# Patient Record
Sex: FEMALE | Race: WHITE | NOT HISPANIC OR LATINO | URBAN - METROPOLITAN AREA
[De-identification: names, ages, dates, MRNs, and addresses within clinical notes are randomized per-mention and may not be internally consistent; named-entity substitution may affect disease eponyms.]

---

## 2018-10-24 LAB — CRC RECOMMENDATION EXT: NORMAL

## 2018-11-13 ENCOUNTER — HISTORICAL (OUTPATIENT)
Dept: RADIOLOGY | Facility: HOSPITAL | Age: 48
End: 2018-11-13

## 2018-11-13 LAB — POC CREATININE: 0.7 MG/DL (ref 0.6–1.3)

## 2020-08-12 ENCOUNTER — HISTORICAL (OUTPATIENT)
Dept: RADIOLOGY | Facility: HOSPITAL | Age: 50
End: 2020-08-12

## 2020-08-18 ENCOUNTER — HISTORICAL (OUTPATIENT)
Dept: RADIOLOGY | Facility: HOSPITAL | Age: 50
End: 2020-08-18

## 2020-10-27 ENCOUNTER — HISTORICAL (OUTPATIENT)
Dept: PREADMISSION TESTING | Facility: HOSPITAL | Age: 50
End: 2020-10-27

## 2020-10-27 LAB
ABS NEUT (OLG): 4.39 X10(3)/MCL (ref 2.1–9.2)
ALBUMIN SERPL-MCNC: 4 GM/DL (ref 3.5–5)
ALBUMIN/GLOB SERPL: 1.4 RATIO (ref 1.1–2)
ALP SERPL-CCNC: 100 UNIT/L (ref 40–150)
ALT SERPL-CCNC: 19 UNIT/L (ref 0–55)
AST SERPL-CCNC: 18 UNIT/L (ref 5–34)
BASOPHILS # BLD AUTO: 0 X10(3)/MCL (ref 0–0.2)
BASOPHILS NFR BLD AUTO: 0 %
BILIRUB SERPL-MCNC: 0.4 MG/DL
BILIRUBIN DIRECT+TOT PNL SERPL-MCNC: 0.1 MG/DL (ref 0–0.5)
BILIRUBIN DIRECT+TOT PNL SERPL-MCNC: 0.3 MG/DL (ref 0–0.8)
BUN SERPL-MCNC: 14 MG/DL (ref 9.8–20.1)
CALCIUM SERPL-MCNC: 10.6 MG/DL (ref 8.4–10.2)
CHLORIDE SERPL-SCNC: 105 MMOL/L (ref 98–107)
CO2 SERPL-SCNC: 29 MMOL/L (ref 22–29)
CREAT SERPL-MCNC: 0.74 MG/DL (ref 0.55–1.02)
EOSINOPHIL # BLD AUTO: 0.3 X10(3)/MCL (ref 0–0.9)
EOSINOPHIL NFR BLD AUTO: 4 %
ERYTHROCYTE [DISTWIDTH] IN BLOOD BY AUTOMATED COUNT: 11.9 % (ref 11.5–17)
GLOBULIN SER-MCNC: 2.9 GM/DL (ref 2.4–3.5)
GLUCOSE SERPL-MCNC: 97 MG/DL (ref 74–100)
HCT VFR BLD AUTO: 42.1 % (ref 37–47)
HGB BLD-MCNC: 13.7 GM/DL (ref 12–16)
LYMPHOCYTES # BLD AUTO: 2.6 X10(3)/MCL (ref 0.6–4.6)
LYMPHOCYTES NFR BLD AUTO: 33 %
MCH RBC QN AUTO: 29 PG (ref 27–31)
MCHC RBC AUTO-ENTMCNC: 32.5 GM/DL (ref 33–36)
MCV RBC AUTO: 89.2 FL (ref 80–94)
MONOCYTES # BLD AUTO: 0.5 X10(3)/MCL (ref 0.1–1.3)
MONOCYTES NFR BLD AUTO: 6 %
NEUTROPHILS # BLD AUTO: 4.39 X10(3)/MCL (ref 2.1–9.2)
NEUTROPHILS NFR BLD AUTO: 57 %
PLATELET # BLD AUTO: 238 X10(3)/MCL (ref 130–400)
PMV BLD AUTO: 10 FL (ref 9.4–12.4)
POTASSIUM SERPL-SCNC: 4.8 MMOL/L (ref 3.5–5.1)
PROT SERPL-MCNC: 6.9 GM/DL (ref 6.4–8.3)
RBC # BLD AUTO: 4.72 X10(6)/MCL (ref 4.2–5.4)
SODIUM SERPL-SCNC: 143 MMOL/L (ref 136–145)
WBC # SPEC AUTO: 7.8 X10(3)/MCL (ref 4.5–11.5)

## 2020-10-30 ENCOUNTER — HISTORICAL (OUTPATIENT)
Dept: ADMINISTRATIVE | Facility: HOSPITAL | Age: 50
End: 2020-10-30

## 2022-04-29 NOTE — H&P
Patient:   Ling Palomo            MRN: [id number]            FIN: [id number]               Age:   50 years     Sex:  Female     :  1970   Associated Diagnoses:   Disease of gallbladder   Author:   Riya Reyna      Basic Information   Source of history:  Self.    History limitation:  None.       Chief Complaint   Abd pain      History of Present Illness   Ms. Palomo is a 49 yo female that presents to [facility] for lap cholecystectomy. Abd US negative. HIDA scan revealed hyperkinetic GB, EF 95%.  Pt c/o intermittent abd pain. No complaints at present.       Review of Systems   Constitutional:  Negative.    Eye:  Negative.    Ear/Nose/Mouth/Throat:  Negative.    Respiratory:  Negative.    Cardiovascular:  Negative.    Gastrointestinal:  Negative.    Genitourinary:  Negative.    Gynecologic:  Negative.    Hematology/Lymphatics:  Negative.    Endocrine:  Negative.    Immunologic:  Negative.    Musculoskeletal:  Negative.    Integumentary:  Negative.    Neurologic:  Negative.    Psychiatric:  Negative.       Health Status   Allergies:    Allergic Reactions (Selected)  No Known Allergies   Current medications:  (Selected)   Inpatient Medications  Ordered  Ancef: 1 gm, form: Infusion, IV Piggyback, On Call, first dose 10/30/20 10:15:00 CDT  Buffered Lidocaine 1% - 1mL syringe: 0.5 mL, form: Injection, ID, As Directed PRN for other (see comment), first dose 10/30/20 10:17:00 CDT, May inject 0.5mL at IV site, if not allergic  CeleBREX: 200 mg, form: Cap, Oral, Once-Unscheduled, first dose 10/30/20 10:17:00 CDT, in holding preop  Lactated Ringers Injection intravenous solution 1,000 mL: 1,000 mL, 1,000 mL, IV, 75 mL/hr, start date 10/30/20 10:15:00 CDT  Lovenox: 40 mg, form: Injection, Subcutaneous, Once, first dose 10/30/20 11:00:00 CDT, stop date 10/30/20 11:00:00 CDT, 2 hours prior to surgery  Neurontin 300 mg oral capsule: 600 mg, form: Cap, Oral, Once-Unscheduled, first dose 10/30/20 10:17:00  CDT, in holding preop  Zofran: 4 mg, IV Push, On Call, first dose 10/30/20 10:17:00 CDT, (give if Reglan contraindicated or patient has history of post anesthesia nausea/vomiting)  acetaminophen: 1,000 mg, form: Tab, Oral, Once-Unscheduled, first dose 10/30/20 11:17:00 CDT, once preop in holding  midazolam: 1 mg, IV Push, q5min PRN for anxiety, Order duration: 2 dose(s), first dose 10/30/20 10:17:00 CDT, stop date Limited # of times, STAT, may repeat x1 in 5 minutes if anxiety not relieved.  Hold  if pregnancy test is pending.  (ADULT PATIENTS)  Documented Medications  Documented  Lexapro 10 mg oral tablet: 0 Refill(s)   Problem list:    All Problems  Depression / SNOMED CT 326835761 / Confirmed  Obesity / SNOMED CT 6599826064 / Probable      Histories   Past Medical History:    No active or resolved past medical history items have been selected or recorded.   Family History:    No family history items have been selected or recorded.   Procedure history:    No active procedure history items have been selected or recorded.   Social History        Social & Psychosocial Habits    Abuse/Neglect  10/28/2020  SHX Any signs of abuse or neglect No  .        Physical Examination   Vital Signs   10/30/2020 10:16 CDT     Temperature Oral          36.7 DegC                             Temperature Oral (calculated)             98.06 DegF                             Peripheral Pulse Rate     83 bpm                             SpO2                      99 %                             Systolic Blood Pressure   144 mmHg  HI                             Diastolic Blood Pressure  75 mmHg                             Mean Arterial Pressure, Cuff              98 mmHg     General:  Alert and oriented, No acute distress.    Neck:  Supple.    Respiratory:  Lungs are clear to auscultation, Respirations are non-labored.    Cardiovascular:  Normal rate, Regular rhythm.    Gastrointestinal:  Soft, Non-tender.    Musculoskeletal:  Normal range  of motion.    Integumentary:  Warm, Dry.    Neurologic:  Alert, Oriented.    Psychiatric:  Cooperative, Appropriate mood & affect.       Impression and Plan   Diagnosis     Disease of gallbladder (ZCS55-FE K82.9).     Education and Follow-up:       Counseled: Patient, Regarding diagnosis, Regarding treatment.    Cherelle Monreal examined patient and obtained informed consent. All pre op questions answered.

## 2022-04-29 NOTE — OP NOTE
Patient:   Ling Palomo            MRN: [id number]            FIN: [id number]               Age:   50 years     Sex:  Female     :  1970   Associated Diagnoses:   Cholecystitis, chronic   Author:   Chon Monreal MD      Operative Note   Operative Information   Procedures Performed: Procedure Code   68821 - LAP CHOLECYSTECTOMY (None) on 10/30/2020 at 50 Years.  Comments:  10/30/2020 13:27 EMANUEL - Felix MEJIA, Amy Mckeon  auto-populated from documented surgical case.     Indications: chronic cholecystitis.     Preoperative Diagnosis: Cholecystitis, chronic (APV23-PZ K81.1).     Postoperative Diagnosis: Cholecystitis, chronic (OVU96-YZ K81.1).     Surgeon: Chon Monreal MD.     Assistant: Riya Reyna     Anesthesia: Gen..     Speciman Removed: gallbladder.     Description of Procedure/Findings/    Complications:     The patient was taken to the operating room. Patient was placed under general anesthesia. Abdomen was prepped and draped in the usual sterile fashion. An appropriate timeout was performed. Optical tipped trocar was used to access the peritoneal cavity. Pneumoperitoneum was instituted. Abdominal exploration was negative. Gallbladder was noted and held in a cephalad direction. The infundibulum was noted and held in a lateral direction. The peritoneum overlying the infundibulum was dissected revealing the cystic duct gallbladder junction and the cystic artery. Critical view was obtained. The cystic duct and cystic artery were clipped and transected. The gallbladder was removed from the undersurface of the liver with sharp and electric dissection. Hemostasis was assured. The clips were in excellent position and there was no bleeding or leakage of bile. Gallbladder was completely removed from the liver hemostasis was assured. The gallbladder was removed from the abdomen. Once again hemostasis was assured the operative site was irrigated until clear. Trochars  were removed and pneumoperitoneum released the incisions were closed with Vicryl..     Esimated blood loss: loss less than  15  cc.     Complications: None.

## 2023-09-21 LAB — BCS RECOMMENDATION EXT: NORMAL

## 2024-01-25 ENCOUNTER — OFFICE VISIT (OUTPATIENT)
Dept: FAMILY MEDICINE | Facility: CLINIC | Age: 54
End: 2024-01-25
Payer: COMMERCIAL

## 2024-01-25 ENCOUNTER — DOCUMENTATION ONLY (OUTPATIENT)
Dept: FAMILY MEDICINE | Facility: CLINIC | Age: 54
End: 2024-01-25

## 2024-01-25 VITALS
DIASTOLIC BLOOD PRESSURE: 84 MMHG | OXYGEN SATURATION: 95 % | SYSTOLIC BLOOD PRESSURE: 110 MMHG | TEMPERATURE: 98 F | RESPIRATION RATE: 17 BRPM | WEIGHT: 196.19 LBS | HEART RATE: 98 BPM

## 2024-01-25 DIAGNOSIS — Z76.89 ENCOUNTER TO ESTABLISH CARE: Primary | ICD-10-CM

## 2024-01-25 DIAGNOSIS — Z98.890 S/P LUMPECTOMY, LEFT BREAST: ICD-10-CM

## 2024-01-25 DIAGNOSIS — R94.8 ABNORMAL RADIONUCLIDE BONE SCAN: ICD-10-CM

## 2024-01-25 DIAGNOSIS — E78.2 MIXED HYPERLIPIDEMIA: ICD-10-CM

## 2024-01-25 DIAGNOSIS — Z13.1 ENCOUNTER FOR SCREENING FOR DIABETES MELLITUS: ICD-10-CM

## 2024-01-25 DIAGNOSIS — Z00.00 WELLNESS EXAMINATION: ICD-10-CM

## 2024-01-25 DIAGNOSIS — Z11.59 ENCOUNTER FOR HEPATITIS C SCREENING TEST FOR LOW RISK PATIENT: ICD-10-CM

## 2024-01-25 DIAGNOSIS — E04.1 THYROID NODULE: ICD-10-CM

## 2024-01-25 DIAGNOSIS — M85.80 OSTEOPENIA, UNSPECIFIED LOCATION: ICD-10-CM

## 2024-01-25 DIAGNOSIS — D35.1 PARATHYROID ADENOMA: ICD-10-CM

## 2024-01-25 DIAGNOSIS — Z11.4 ENCOUNTER FOR SCREENING FOR HIV: ICD-10-CM

## 2024-01-25 PROCEDURE — 3074F SYST BP LT 130 MM HG: CPT | Mod: CPTII,,, | Performed by: STUDENT IN AN ORGANIZED HEALTH CARE EDUCATION/TRAINING PROGRAM

## 2024-01-25 PROCEDURE — 99386 PREV VISIT NEW AGE 40-64: CPT | Mod: ,,, | Performed by: STUDENT IN AN ORGANIZED HEALTH CARE EDUCATION/TRAINING PROGRAM

## 2024-01-25 PROCEDURE — 1159F MED LIST DOCD IN RCRD: CPT | Mod: CPTII,,, | Performed by: STUDENT IN AN ORGANIZED HEALTH CARE EDUCATION/TRAINING PROGRAM

## 2024-01-25 PROCEDURE — 3079F DIAST BP 80-89 MM HG: CPT | Mod: CPTII,,, | Performed by: STUDENT IN AN ORGANIZED HEALTH CARE EDUCATION/TRAINING PROGRAM

## 2024-01-25 PROCEDURE — 1160F RVW MEDS BY RX/DR IN RCRD: CPT | Mod: CPTII,,, | Performed by: STUDENT IN AN ORGANIZED HEALTH CARE EDUCATION/TRAINING PROGRAM

## 2024-01-25 RX ORDER — EXEMESTANE 25 MG/1
25 TABLET ORAL EVERY MORNING
COMMUNITY

## 2024-01-25 RX ORDER — VENLAFAXINE HYDROCHLORIDE 37.5 MG/1
1 CAPSULE, EXTENDED RELEASE ORAL EVERY MORNING
COMMUNITY
Start: 2023-08-28

## 2024-01-25 RX ORDER — IVERMECTIN 10 MG/G
1 CREAM TOPICAL DAILY
COMMUNITY
Start: 2018-12-22

## 2024-01-25 NOTE — PROGRESS NOTES
Patient ID: [id number]     Chief Complaint: Establish Care        HPI:      Disclaimer:  This note is prepared using voice recognition software and as such is likely to have errors despite attempts at proofreading. Please contact me for questions.    Ling Palomo is a 53 y.o. female here today for an annual wellness visit.    Patient is here to establish care.    Acute Issues-    S/p left breast lumpectomy for infiltrating ductal carcinoma diagnosed on .  Patient has been following Dr. Ira Dumont from [facility].  She is status post lumpectomy on 2022 and completed chemotherapy and radiotherapy regarding the same.  Her last mammogram on 2023 was unremarkable    Thyroid nodule   Is following Dr. Feng from [facility].   Most recent sestamibi scan demonstrated calcified left thyroid nodule measuring 0.6 x 0.9 cm  Biopsy demonstrated colloid nodule    Prominent right level 2 axillary lymph node.  Is scheduled to see  Parathyroid adenoma  Has been having hypercalcemia likely secondary to parathyroid adenoma.    Currently asymptomatic  Is about to undergo parathyroidectomy at [facility]    She recently had a surveillance NM bone scan on 2023 which demonstrated increased tracer activity at the costochondral junction of left 10th rib which requires CT contrast chest.     DEXA scan on 2023 demonstrated osteopenia  Currently not on any oral supplementations    Chronic Issues-  No past medical history on file.     Past Surgical History:   Procedure Laterality Date    BREAST SURGERY  2022    lumpectomy, mastopexy     SECTION  1987, 1992    CHOLECYSTECTOMY  10/30/2020    COSMETIC SURGERY  2023    Mastopexy    HYSTERECTOMY  2008    Partial    TUBAL LIGATION  1992       Review of patient's allergies indicates:   Allergen Reactions    Adhesive     Adhesive tape-silicones      Paper tape- redness       Current Outpatient Medications   Medication  Instructions    exemestane (AROMASIN) 25 mg, Oral, Every morning    ivermectin (SOOLANTRA) 1 %, Topical (Top), Daily    venlafaxine (EFFEXOR-XR) 37.5 MG 24 hr capsule 1 capsule, Oral, Every morning       Social History     Socioeconomic History    Marital status: Single   Tobacco Use    Smoking status: Former     Current packs/day: 0.25     Average packs/day: 0.3 packs/day for 2.0 years (0.5 ttl pk-yrs)     Types: Cigarettes     Passive exposure: Never    Smokeless tobacco: Never   Substance and Sexual Activity    Alcohol use: Yes     Alcohol/week: 1.0 standard drink of alcohol     Types: 1 Glasses of wine per week    Drug use: Not Currently     Types: Marijuana     Comment: as a teenager    Sexual activity: Yes     Partners: Female     Birth control/protection: Post-menopausal     Social Determinants of Health     Financial Resource Strain: Low Risk  (1/22/2024)    Overall Financial Resource Strain (CARDIA)     Difficulty of Paying Living Expenses: Not hard at all   Food Insecurity: No Food Insecurity (1/22/2024)    Hunger Vital Sign     Worried About Running Out of Food in the Last Year: Never true     Ran Out of Food in the Last Year: Never true   Transportation Needs: No Transportation Needs (1/22/2024)    PRAPARE - Transportation     Lack of Transportation (Medical): No     Lack of Transportation (Non-Medical): No   Physical Activity: Unknown (1/22/2024)    Exercise Vital Sign     Days of Exercise per Week: 0 days   Stress: No Stress Concern Present (1/22/2024)    [employer] - Occupational Stress Questionnaire     Feeling of Stress : Only a little   Social Connections: Unknown (1/22/2024)    Social Connection and Isolation Panel [NHANES]     Frequency of Communication with Friends and Family: More than three times a week     Frequency of Social Gatherings with Friends and Family: Once a week     Active Member of Clubs or Organizations: No     Marital Status:    Housing  Stability: Low Risk  (1/22/2024)    Housing Stability Vital Sign     Unable to Pay for Housing in the Last Year: No     Number of Places Lived in the Last Year: 1     Unstable Housing in the Last Year: No        Family History   Problem Relation Age of Onset    Cancer Mother     Depression Mother     Cancer Father     Heart disease Father     Cancer Paternal Grandfather     Cancer Paternal Grandmother     Cancer Paternal Aunt     Cancer Paternal Aunt     Depression Daughter     Depression Brother     Diabetes Brother         Patient Care Team:  Brianna Khalil MD as PCP - General (Family Medicine)       Subjective:     ROS    See HPI for details    Constitutional: Denies Change in appetite. Denies Chills. Denies Fever. Denies Night sweats.  Respiratory: Denies Cough. Denies Shortness of breath. Denies Shortness of breath with exertion. Denies Wheezing.  Cardiovascular: DeniesChest pain at rest. Denies Chest pain with exertion. Denies Irregular heartbeat. Denies Palpitations. Denies Edema.  Gastrointestinal: Denies Abdominal pain. DeniesDiarrhea. Denies Nausea. Denies Vomiting. Denies Hematemesis or Hematochezia.  Genitourinary: Denies Dysuria. Denies Urinary frequency. Denies Urinary urgency. Denies Blood in urine.  Endocrine: Denies Cold intolerance. Denies Excessive thirst. Denies Heat intolerance. Denies Weight loss. Denies Weight gain.  Musculoskeletal: Denies Painful joints. Denies Weakness.  Integumentary: Denies Rash. Denies Itching. Denies Dry skin.  Neurologic: Denies Dizziness. Denies Fainting. Denies Headache.  Psychiatric: Denies Depression. Denies Anxiety. Denies Suicidal/Homicidal ideations.    All Other ROS: Negative except as stated in HPI.       Objective:     Visit Vitals  /84 (BP Location: Left arm, Patient Position: Sitting, BP Method: Large (Manual))   Pulse 98   Temp 98.1 °F (36.7 °C) (Oral)   Resp 17   Wt 89 kg (196 lb 3.2 oz)   SpO2 95%       Physical Exam    General: Alert and  oriented, No acute distress.  Neck/Thyroid:  Palpable thyroid  Respiratory: Clear to auscultation bilaterally; No wheezes  Cardiovascular: Regular rate and rhythm  Gastrointestinal: Soft, Non-tender,No palpable organomegaly.  Musculoskeletal: Normal range of motion.  Neurologic: No focal deficits  Psychiatric: Normal interaction, Coherent speech, Euthymic mood, Appropriate affect       Assessment:       ICD-10-CM ICD-9-CM   1. Encounter to establish care  Z76.89 V65.8   2. Encounter for screening for HIV  Z11.4 V73.89   3. Encounter for hepatitis C screening test for low risk patient  Z11.59 V73.89   4. Encounter for screening for diabetes mellitus  Z13.1 V77.1   5. Parathyroid adenoma  D35.1 227.1   6. Mixed hyperlipidemia  E78.2 272.2   7. Thyroid nodule  E04.1 241.0   8. S/P lumpectomy, left breast  Z98.890 V45.89   9. Abnormal radionuclide bone scan  R94.8 794.9   10. Wellness examination  Z00.00 V70.0        Plan:         Health Maintenance Topics with due status: Not Due       Topic Last Completion Date    TETANUS VACCINE 08/12/2023    Mammogram 09/21/2023    Lipid Panel 09/21/2023          Vaccinations -   Immunization History   Administered Date(s) Administered    COVID-19, MRNA, LN-S, PF (Pfizer) (Purple Cap) 04/17/2021, 05/08/2021, 12/11/2021    Influenza - Quadrivalent - [facility] - PF 01/06/2024    Influenza - Quadrivalent - PF *Preferred* (6 months and older) 02/04/2018, 09/28/2020, 12/11/2021    Tdap 08/12/2023        1. Encounter to establish care  2. Wellness examination  -     Urinalysis; Future; Expected date: 01/25/2024  Cervical Cancer Screening - last Pap smear performed by Dr. Farris at [facility]   Breast Cancer Screening - Last Mammogram on 9/23. Normal. Follow up in 1 year  Colon Cancer Screening - colonoscopy completed at [facility] in a Gastroenterology.  We will retrieve the records   Osteoporosis Screening -  DEXA demonstrated osteopenia  Eye Exam - Recommend annually.  Dental Exam - Recommend  biannual exams.     Diet discussed (healthy food choices, reduce portions and overall calorie intake)  Exercise 30-45 minutes 5x per week  Avoid excessive alcohol and tobacco if taking  Immunizations dicussed  Monthly breast exam recommended  Preventative exams discussed.      3. Encounter for screening for HIV  -     HIV 1/2 Ag/Ab (4th Gen); Future; Expected date: 01/25/2024    4. Encounter for hepatitis C screening test for low risk patient  -     Hepatitis C Antibody; Future; Expected date: 01/25/2024    5. Encounter for screening for diabetes mellitus  -     Hemoglobin A1C; Future; Expected date: 01/25/2024    6. Parathyroid adenoma  We will recommend thyroidectomy per [facility]    7. Mixed hyperlipidemia  Recommend to start 35 minutes of brisk walking along with Mediterranean diet    8. Thyroid nodule  Keep scheduled follow up with endocrinology Dr. Feng at [facility]  We will continue to monitor    9. S/P lumpectomy, left breast  Keep scheduled visit with Dr. Ira Dumont    10. Abnormal radionuclide bone scan  -     CT Chest With Contrast; Future; Expected date: 01/25/2024    11. Osteopenia, unspecified location  Recommendations   Calcium/vitamin-D-  Encourage adequate calcium intake from diet alone (approximately 1200 mg daily).  I  ncorporate food like milk, yogurt, orange juice, chills food with calcium, cheese, beans, dark leafy green vegetables and walnuts.    If unable to take dietary intake then can start supplemental calcium not exceeding 500-1000 mg per day in divided doses.    Confirm with the cardiologist if you could take calcium supplements are not.    Should achieve daily intake of 800 I U of vitamin-D.  Weight bearing exercise such as walking or resistance training to build bones 5 times a week.  Avoid soda and excessive alcohol.  Avoid falls by assessing home for loose cords and rugs, wearing proper footwear, and using proper lighting in rooms.   Repeat DEXA  per endocrine in [facility]         Medication List with Changes/Refills   Current Medications    EXEMESTANE (AROMASIN) 25 MG TABLET    Take 25 mg by mouth every morning.       Start Date: --        End Date: --    IVERMECTIN (SOOLANTRA) 1 % CREA    Apply 1 % topically Daily.       Start Date: 12/22/2018End Date: --    VENLAFAXINE (EFFEXOR-XR) 37.5 MG 24 HR CAPSULE    Take 1 capsule by mouth every morning.       Start Date: 8/28/2023 End Date: --          The patient's Health Maintenance was reviewed and the following appears to be due at this time:   Health Maintenance Due   Topic Date Due    Hepatitis C Screening  Never done    HIV Screening  Never done    Colorectal Cancer Screening  Never done    Shingles Vaccine (1 of 2) Never done    COVID-19 Vaccine (4 - 2023-24 season) 09/01/2023         Follow up in about 4 months (around 5/25/2024) for Parthyrroid. 1 AW .   In addition to their scheduled follow up, the patient has also been instructed to follow up on as needed basis.

## 2024-01-26 ENCOUNTER — DOCUMENTATION ONLY (OUTPATIENT)
Dept: FAMILY MEDICINE | Facility: CLINIC | Age: 54
End: 2024-01-26
Payer: COMMERCIAL

## 2024-05-22 ENCOUNTER — TELEPHONE (OUTPATIENT)
Dept: FAMILY MEDICINE | Facility: CLINIC | Age: 54
End: 2024-05-22
Payer: COMMERCIAL

## 2024-05-22 NOTE — TELEPHONE ENCOUNTER
Are there any outstanding tasks in the patients's chart (ex.labs,MM,etc)? Yes 3  Do we have outstanding/pending referrals? no  Has the patient been seen in and ER,UCC, or been admitted since last visit? no  Has the patient seen any other health care provider(doctors) since last visit? no  Has the patient had any bloodwork or x-rays done since last visit? Yes a CT scan.     Called pt to let her know, no answer. Left a detailed message.

## 2024-05-27 ENCOUNTER — OFFICE VISIT (OUTPATIENT)
Dept: FAMILY MEDICINE | Facility: CLINIC | Age: 54
End: 2024-05-27
Payer: COMMERCIAL

## 2024-05-27 DIAGNOSIS — Z00.00 WELL ADULT HEALTH CHECK: ICD-10-CM

## 2024-05-27 DIAGNOSIS — Z98.890 STATUS POST PARATHYROIDECTOMY: ICD-10-CM

## 2024-05-27 DIAGNOSIS — E04.2 MULTINODULAR GOITER: ICD-10-CM

## 2024-05-27 DIAGNOSIS — Z85.3 HISTORY OF BREAST CANCER: ICD-10-CM

## 2024-05-27 DIAGNOSIS — Z90.89 STATUS POST PARATHYROIDECTOMY: ICD-10-CM

## 2024-05-27 PROBLEM — M25.559 HIP PAIN: Status: ACTIVE | Noted: 2022-10-13

## 2024-05-27 PROBLEM — Z79.811 LONG TERM CURRENT USE OF AROMATASE INHIBITOR: Status: ACTIVE | Noted: 2022-10-13

## 2024-05-27 PROBLEM — Z17.0 ESTROGEN RECEPTOR POSITIVE STATUS (ER+): Status: ACTIVE | Noted: 2022-10-13

## 2024-05-27 PROBLEM — N95.1 MENOPAUSAL FLUSHING: Status: ACTIVE | Noted: 2022-10-13

## 2024-05-27 PROBLEM — E21.0 PRIMARY HYPERPARATHYROIDISM: Status: ACTIVE | Noted: 2023-12-12

## 2024-05-27 PROBLEM — C50.812 MALIGNANT NEOPLASM OF OVERLAPPING SITES OF LEFT FEMALE BREAST: Status: ACTIVE | Noted: 2021-12-23

## 2024-05-27 PROBLEM — N65.0 DEFORMITY OF RECONSTRUCTED BREAST: Status: ACTIVE | Noted: 2022-02-03

## 2024-05-27 PROBLEM — Z80.3 FAMILY HISTORY OF MALIGNANT NEOPLASM OF BREAST: Status: ACTIVE | Noted: 2022-02-01

## 2024-05-27 PROCEDURE — 1160F RVW MEDS BY RX/DR IN RCRD: CPT | Mod: CPTII,95,, | Performed by: STUDENT IN AN ORGANIZED HEALTH CARE EDUCATION/TRAINING PROGRAM

## 2024-05-27 PROCEDURE — 1159F MED LIST DOCD IN RCRD: CPT | Mod: CPTII,95,, | Performed by: STUDENT IN AN ORGANIZED HEALTH CARE EDUCATION/TRAINING PROGRAM

## 2024-05-27 PROCEDURE — 99213 OFFICE O/P EST LOW 20 MIN: CPT | Mod: 95,,, | Performed by: STUDENT IN AN ORGANIZED HEALTH CARE EDUCATION/TRAINING PROGRAM

## 2024-05-27 NOTE — PROGRESS NOTES
Patient ID: [id number]     Chief Complaint: No chief complaint on file.      HPI:     This is a telemedicine note. Patient was treated using telemedicine, real time audio and video, according to [facility] protocols. I, Brianna Khalil MD, conducted the visit from the [facility]. The patient participated in the visit at a non-[facility] location selected by the patient, identified below. I am licensed in the state where the patient stated they are located. The patient stated that they understood and accepted the privacy and security risks to their information at their location. This visit is not recorded.    Patient was located at the patient's home.       Ling Palomo is a 53 y.o. female here today for a telemedicine visit.     S/p left breast lumpectomy for infiltrating ductal carcinoma diagnosed on .  Patient has been following Dr. Ira Dumont from [facility].  She is status post lumpectomy on 2022 and completed chemotherapy and radiotherapy regarding the same.  Her last mammogram on 2023 was unremarkable     Thyroid nodule   Is following Dr. Feng from [facility].   Most recent sestamibi scan demonstrated calcified left thyroid nodule measuring 0.6 x 0.9 cm  Biopsy demonstrated colloid nodule and being monitored by Dr. Feng     Prominent right level 2 axillary lymph node.   Parathyroid adenoma  S/p  parathyroidectomy at [facility] 3/8/24     She rhad a surveillance NM bone scan on 2023 which demonstrated increased tracer activity at the costochondral junction of left 10th rib which requires CT contrast chest. Ct ordered but never got a chance to get the CT. Asx     DEXA scan on 2023 demonstrated osteopenia  Currently not on any oral supplementations       No past medical history on file.     Past Surgical History:   Procedure Laterality Date    BREAST SURGERY  2022    lumpectomy, mastopexy     SECTION  1987, 1992    CHOLECYSTECTOMY   10/30/2020    COSMETIC SURGERY  02/03/2023    Mastopexy    HYSTERECTOMY  2008    Partial    TUBAL LIGATION  08/11/1992       Review of patient's allergies indicates:   Allergen Reactions    Adhesive     Adhesive tape-silicones      Paper tape- redness       Current Outpatient Medications   Medication Instructions    exemestane (AROMASIN) 25 mg, Oral, Every morning    ivermectin (SOOLANTRA) 1 %, Topical (Top), Daily    venlafaxine (EFFEXOR-XR) 37.5 MG 24 hr capsule 1 capsule, Oral, Every morning       Social History     Socioeconomic History    Marital status: Single   Tobacco Use    Smoking status: Former     Current packs/day: 0.25     Average packs/day: 0.3 packs/day for 2.0 years (0.5 ttl pk-yrs)     Types: Cigarettes     Passive exposure: Never    Smokeless tobacco: Never   Substance and Sexual Activity    Alcohol use: Yes     Alcohol/week: 1.0 standard drink of alcohol     Types: 1 Glasses of wine per week    Drug use: Not Currently     Types: Marijuana     Comment: as a teenager    Sexual activity: Yes     Partners: Female     Birth control/protection: Post-menopausal     Social Determinants of Health     Financial Resource Strain: Low Risk  (1/22/2024)    Overall Financial Resource Strain (CARDIA)     Difficulty of Paying Living Expenses: Not hard at all   Food Insecurity: No Food Insecurity (1/22/2024)    Hunger Vital Sign     Worried About Running Out of Food in the Last Year: Never true     Ran Out of Food in the Last Year: Never true   Transportation Needs: No Transportation Needs (1/22/2024)    PRAPARE - Transportation     Lack of Transportation (Medical): No     Lack of Transportation (Non-Medical): No   Physical Activity: Unknown (1/22/2024)    Exercise Vital Sign     Days of Exercise per Week: 0 days   Stress: No Stress Concern Present (1/22/2024)    [employer] [place] [employer] - Occupational Stress Questionnaire     Feeling of Stress : Only a little   Housing Stability: Low Risk   (1/22/2024)    Housing Stability Vital Sign     Unable to Pay for Housing in the Last Year: No     Number of Places Lived in the Last Year: 1     Unstable Housing in the Last Year: No        Family History   Problem Relation Name Age of Onset    Cancer Mother Karolina Saldana     Depression Mother Karolina Saldana     Cancer Father Ravi Brown     Heart disease Father Ravi Brown     Cancer Paternal Grandfather Michael Brown     Cancer Paternal Grandmother Windy Brown     Cancer Paternal Aunt Christel Powers     Cancer Paternal Aunt Crista Swift     Depression Daughter Jovita Alvares     Depression Brother Migue Brown     Diabetes Brother Shabbir Brown         Patient Care Team:  Brianna Khalil MD as PCP - General (Family Medicine)      Subjective:       ROS    See HPI for details    Constitutional: Denies Change in appetite. Denies Chills. Denies Fever. Denies Night sweats.  Eye: Denies Blurred vision. Denies Discharge. Denies Eye pain.  ENT: Denies Decreased hearing. Denies Sore throat. Denies Swollen glands.  Respiratory: Denies Cough. Denies Shortness of breath. Denies Shortness of breath with exertion. Denies Wheezing.  Cardiovascular: DeniesChest pain at rest. Denies Chest pain with exertion. Denies Irregular heartbeat. Denies Palpitations. Denies Edema.  Gastrointestinal: Denies Abdominal pain. DeniesDiarrhea. Denies Nausea. Denies Vomiting. Denies Hematemesis or Hematochezia.  Genitourinary: Denies Dysuria. Denies Urinary frequency. Denies Urinary urgency. Denies Blood in urine.  Endocrine: Denies Cold intolerance. Denies Excessive thirst. Denies Heat intolerance. Denies Weight loss. Denies Weight gain.  Musculoskeletal: Denies Painful joints. Denies Weakness.  Integumentary: Denies Rash. Denies Itching. Denies Dry skin.  Neurologic: Denies Dizziness. Denies Fainting. Denies Headache.  Psychiatric: Denies Depression. Denies Anxiety. Denies Suicidal/Homicidal  ideations.    All Other ROS: Negative except as stated in HPI.       Objective:     There were no vitals taken for this visit.    Physical Exam    Physical Exam: LIMITED DUE TO TELEMEDICINE RESTRICTIONS.  General: Alert and oriented, No acute distress.  Head: Normocephalic, Atraumatic.  Eye: Sclera non-icteric.  Neck/Thyroid:  Full range of motion.  Respiratory: Non-labored respirations, Symmetrical chest wall expansion.  Musculoskeletal: Normal range of motion.  Integumentary:  No visible suspicious lesions or rashes. No diaphoresis.   Neurologic: No focal deficits  Psychiatric: Normal interaction, Coherent speech, Euthymic mood, Appropriate affect       Assessment:       ICD-10-CM ICD-9-CM   1. Multinodular goiter  E04.2 241.1   2. Status post parathyroidectomy  Z98.890 V45.89    Z90.89 V45.79   3. History of breast cancer  Z85.3 V10.3   4. Well adult health check  Z00.00 V70.0        Plan:     1. Multinodular goiter  Overview:  01/06/2015:  FNA at [facility]-colloid nodule with involution  Keep scheduled visit with primary    2. Status post parathyroidectomy  Continue to monitor   Patient asymptomatic     3. History of breast cancer  Keep Rx as prescribed per primary    4. Well adult health check  -     CBC Auto Differential; Future; Expected date: 05/27/2024  -     Comprehensive Metabolic Panel; Future; Expected date: 05/27/2024  -     Lipid Panel; Future; Expected date: 05/27/2024  -     TSH; Future; Expected date: 05/27/2024  -     Hemoglobin A1C; Future; Expected date: 05/27/2024  -     Urinalysis; Future; Expected date: 05/27/2024  -     T4, Free; Future; Expected date: 05/27/2024  -     Vitamin D; Future; Expected date: 05/27/2024           Medication List with Changes/Refills   Current Medications    EXEMESTANE (AROMASIN) 25 MG TABLET    Take 25 mg by mouth every morning.       Start Date: --        End Date: --    IVERMECTIN (SOOLANTRA) 1 % CREA    Apply 1 % topically Daily.       Start Date: 12/22/2018End  Date: --    VENLAFAXINE (EFFEXOR-XR) 37.5 MG 24 HR CAPSULE    Take 1 capsule by mouth every morning.       Start Date: 8/28/2023 End Date: --          Follow up for Annual Wellness. In addition to their scheduled follow up, the patient has also been instructed to follow up on as needed basis.       Video Time Documentation:  Spent 10 minutes with patient face to face discussed health concerns. More than 50% of this time was spent in counseling and coordination of care.  Answers submitted by the patient for this visit:  Review of Systems Questionnaire (Submitted on 5/27/2024)  activity change: No  unexpected weight change: No  rhinorrhea: No  trouble swallowing: No  visual disturbance: No  chest tightness: No  polyuria: No  difficulty urinating: No  menstrual problem: No  joint swelling: No  arthralgias: Yes  confusion: No  dysphoric mood: No

## 2024-10-08 LAB — BCS RECOMMENDATION EXT: NORMAL

## 2024-11-07 LAB — BMD RECOMMENDATION EXT: NORMAL

## 2025-01-22 ENCOUNTER — PATIENT MESSAGE (OUTPATIENT)
Dept: FAMILY MEDICINE | Facility: CLINIC | Age: 55
End: 2025-01-22
Payer: COMMERCIAL

## 2025-01-24 DIAGNOSIS — Z00.00 WELLNESS EXAMINATION: Primary | ICD-10-CM

## 2025-01-27 ENCOUNTER — LAB VISIT (OUTPATIENT)
Dept: LAB | Facility: HOSPITAL | Age: 55
End: 2025-01-27
Attending: STUDENT IN AN ORGANIZED HEALTH CARE EDUCATION/TRAINING PROGRAM
Payer: COMMERCIAL

## 2025-01-27 DIAGNOSIS — Z00.00 WELL ADULT HEALTH CHECK: ICD-10-CM

## 2025-01-27 DIAGNOSIS — Z11.4 ENCOUNTER FOR SCREENING FOR HIV: ICD-10-CM

## 2025-01-27 DIAGNOSIS — Z11.59 ENCOUNTER FOR HEPATITIS C SCREENING TEST FOR LOW RISK PATIENT: ICD-10-CM

## 2025-01-27 DIAGNOSIS — Z00.00 WELLNESS EXAMINATION: ICD-10-CM

## 2025-01-27 LAB
25(OH)D3+25(OH)D2 SERPL-MCNC: 45 NG/ML (ref 30–80)
ALBUMIN SERPL-MCNC: 3.7 G/DL (ref 3.5–5)
ALBUMIN/GLOB SERPL: 1.2 RATIO (ref 1.1–2)
ALP SERPL-CCNC: 118 UNIT/L (ref 40–150)
ALT SERPL-CCNC: 23 UNIT/L (ref 0–55)
ANION GAP SERPL CALC-SCNC: 8 MEQ/L
AST SERPL-CCNC: 16 UNIT/L (ref 5–34)
BACTERIA #/AREA URNS AUTO: ABNORMAL /HPF
BASOPHILS # BLD AUTO: 0.03 X10(3)/MCL
BASOPHILS NFR BLD AUTO: 0.3 %
BILIRUB SERPL-MCNC: 0.3 MG/DL
BILIRUB UR QL STRIP.AUTO: NEGATIVE
BUN SERPL-MCNC: 18.1 MG/DL (ref 9.8–20.1)
CALCIUM SERPL-MCNC: 9.6 MG/DL (ref 8.4–10.2)
CHLORIDE SERPL-SCNC: 108 MMOL/L (ref 98–107)
CHOLEST SERPL-MCNC: 241 MG/DL
CHOLEST/HDLC SERPL: 5 {RATIO} (ref 0–5)
CLARITY UR: CLEAR
CO2 SERPL-SCNC: 26 MMOL/L (ref 22–29)
COLOR UR AUTO: ABNORMAL
CREAT SERPL-MCNC: 0.76 MG/DL (ref 0.55–1.02)
CREAT/UREA NIT SERPL: 24
EOSINOPHIL # BLD AUTO: 0.32 X10(3)/MCL (ref 0–0.9)
EOSINOPHIL NFR BLD AUTO: 3.5 %
ERYTHROCYTE [DISTWIDTH] IN BLOOD BY AUTOMATED COUNT: 12.1 % (ref 11.5–17)
EST. AVERAGE GLUCOSE BLD GHB EST-MCNC: 114 MG/DL
GFR SERPLBLD CREATININE-BSD FMLA CKD-EPI: >60 ML/MIN/1.73/M2
GLOBULIN SER-MCNC: 3.1 GM/DL (ref 2.4–3.5)
GLUCOSE SERPL-MCNC: 101 MG/DL (ref 74–100)
GLUCOSE UR QL STRIP: NEGATIVE
HBA1C MFR BLD: 5.6 %
HCT VFR BLD AUTO: 40.7 % (ref 37–47)
HCV AB SERPL QL IA: NONREACTIVE
HDLC SERPL-MCNC: 45 MG/DL (ref 35–60)
HGB BLD-MCNC: 13.4 G/DL (ref 12–16)
HGB UR QL STRIP: NEGATIVE
HIV 1+2 AB+HIV1 P24 AG SERPL QL IA: NONREACTIVE
IMM GRANULOCYTES # BLD AUTO: 0.03 X10(3)/MCL (ref 0–0.04)
IMM GRANULOCYTES NFR BLD AUTO: 0.3 %
KETONES UR QL STRIP: NEGATIVE
LDLC SERPL CALC-MCNC: 177 MG/DL (ref 50–140)
LEUKOCYTE ESTERASE UR QL STRIP: NEGATIVE
LYMPHOCYTES # BLD AUTO: 2.57 X10(3)/MCL (ref 0.6–4.6)
LYMPHOCYTES NFR BLD AUTO: 28.2 %
MCH RBC QN AUTO: 28.5 PG (ref 27–31)
MCHC RBC AUTO-ENTMCNC: 32.9 G/DL (ref 33–36)
MCV RBC AUTO: 86.6 FL (ref 80–94)
MONOCYTES # BLD AUTO: 0.48 X10(3)/MCL (ref 0.1–1.3)
MONOCYTES NFR BLD AUTO: 5.3 %
MUCOUS THREADS URNS QL MICRO: ABNORMAL /LPF
NEUTROPHILS # BLD AUTO: 5.69 X10(3)/MCL (ref 2.1–9.2)
NEUTROPHILS NFR BLD AUTO: 62.4 %
NITRITE UR QL STRIP: NEGATIVE
NRBC BLD AUTO-RTO: 0 %
PH UR STRIP: 6 [PH]
PLATELET # BLD AUTO: 279 X10(3)/MCL (ref 130–400)
PMV BLD AUTO: 8.9 FL (ref 7.4–10.4)
POTASSIUM SERPL-SCNC: 4.8 MMOL/L (ref 3.5–5.1)
PROT SERPL-MCNC: 6.8 GM/DL (ref 6.4–8.3)
PROT UR QL STRIP: NEGATIVE
RBC # BLD AUTO: 4.7 X10(6)/MCL (ref 4.2–5.4)
RBC #/AREA URNS AUTO: ABNORMAL /HPF
SODIUM SERPL-SCNC: 142 MMOL/L (ref 136–145)
SP GR UR STRIP.AUTO: >=1.03 (ref 1–1.03)
SQUAMOUS #/AREA URNS LPF: ABNORMAL /HPF
TRIGL SERPL-MCNC: 94 MG/DL (ref 37–140)
TSH SERPL-ACNC: 0.41 UIU/ML (ref 0.35–4.94)
UROBILINOGEN UR STRIP-ACNC: 0.2
VLDLC SERPL CALC-MCNC: 19 MG/DL
WBC # BLD AUTO: 9.12 X10(3)/MCL (ref 4.5–11.5)
WBC #/AREA URNS AUTO: ABNORMAL /HPF

## 2025-01-27 PROCEDURE — 84443 ASSAY THYROID STIM HORMONE: CPT

## 2025-01-27 PROCEDURE — 80053 COMPREHEN METABOLIC PANEL: CPT

## 2025-01-27 PROCEDURE — 86803 HEPATITIS C AB TEST: CPT

## 2025-01-27 PROCEDURE — 36415 COLL VENOUS BLD VENIPUNCTURE: CPT

## 2025-01-27 PROCEDURE — 83036 HEMOGLOBIN GLYCOSYLATED A1C: CPT

## 2025-01-27 PROCEDURE — 80061 LIPID PANEL: CPT

## 2025-01-27 PROCEDURE — 82306 VITAMIN D 25 HYDROXY: CPT

## 2025-01-27 PROCEDURE — 87389 HIV-1 AG W/HIV-1&-2 AB AG IA: CPT

## 2025-01-27 PROCEDURE — 85025 COMPLETE CBC W/AUTO DIFF WBC: CPT

## 2025-01-27 PROCEDURE — 81001 URINALYSIS AUTO W/SCOPE: CPT

## 2025-01-27 NOTE — PROGRESS NOTES
Please inform patient of lab results.    Hep C nonreactive   HIV nonreactive    Thanks,    Dr. Khalil

## 2025-01-28 ENCOUNTER — DOCUMENTATION ONLY (OUTPATIENT)
Dept: FAMILY MEDICINE | Facility: CLINIC | Age: 55
End: 2025-01-28

## 2025-01-28 ENCOUNTER — PATIENT MESSAGE (OUTPATIENT)
Dept: FAMILY MEDICINE | Facility: CLINIC | Age: 55
End: 2025-01-28

## 2025-01-28 ENCOUNTER — OFFICE VISIT (OUTPATIENT)
Dept: FAMILY MEDICINE | Facility: CLINIC | Age: 55
End: 2025-01-28
Payer: COMMERCIAL

## 2025-01-28 VITALS
OXYGEN SATURATION: 96 % | RESPIRATION RATE: 18 BRPM | BODY MASS INDEX: 31.32 KG/M2 | TEMPERATURE: 98 F | SYSTOLIC BLOOD PRESSURE: 122 MMHG | DIASTOLIC BLOOD PRESSURE: 79 MMHG | WEIGHT: 188 LBS | HEIGHT: 65 IN | HEART RATE: 89 BPM

## 2025-01-28 DIAGNOSIS — Z00.00 WELL ADULT HEALTH CHECK: ICD-10-CM

## 2025-01-28 DIAGNOSIS — Z23 NEED FOR SHINGLES VACCINE: ICD-10-CM

## 2025-01-28 DIAGNOSIS — Z00.00 WELLNESS EXAMINATION: ICD-10-CM

## 2025-01-28 DIAGNOSIS — Z23 NEED FOR VACCINATION FOR STREP PNEUMONIAE: ICD-10-CM

## 2025-01-28 DIAGNOSIS — E78.5 HYPERLIPIDEMIA, UNSPECIFIED HYPERLIPIDEMIA TYPE: ICD-10-CM

## 2025-01-28 PROBLEM — C50.812 MALIGNANT NEOPLASM OF OVERLAPPING SITES OF LEFT FEMALE BREAST: Status: RESOLVED | Noted: 2021-12-23 | Resolved: 2025-01-28

## 2025-01-28 PROCEDURE — 1160F RVW MEDS BY RX/DR IN RCRD: CPT | Mod: CPTII,,, | Performed by: STUDENT IN AN ORGANIZED HEALTH CARE EDUCATION/TRAINING PROGRAM

## 2025-01-28 PROCEDURE — 3078F DIAST BP <80 MM HG: CPT | Mod: CPTII,,, | Performed by: STUDENT IN AN ORGANIZED HEALTH CARE EDUCATION/TRAINING PROGRAM

## 2025-01-28 PROCEDURE — 3074F SYST BP LT 130 MM HG: CPT | Mod: CPTII,,, | Performed by: STUDENT IN AN ORGANIZED HEALTH CARE EDUCATION/TRAINING PROGRAM

## 2025-01-28 PROCEDURE — 3044F HG A1C LEVEL LT 7.0%: CPT | Mod: CPTII,,, | Performed by: STUDENT IN AN ORGANIZED HEALTH CARE EDUCATION/TRAINING PROGRAM

## 2025-01-28 PROCEDURE — 1159F MED LIST DOCD IN RCRD: CPT | Mod: CPTII,,, | Performed by: STUDENT IN AN ORGANIZED HEALTH CARE EDUCATION/TRAINING PROGRAM

## 2025-01-28 PROCEDURE — 99396 PREV VISIT EST AGE 40-64: CPT | Mod: ,,, | Performed by: STUDENT IN AN ORGANIZED HEALTH CARE EDUCATION/TRAINING PROGRAM

## 2025-01-28 PROCEDURE — 3008F BODY MASS INDEX DOCD: CPT | Mod: CPTII,,, | Performed by: STUDENT IN AN ORGANIZED HEALTH CARE EDUCATION/TRAINING PROGRAM

## 2025-01-28 RX ORDER — ZOSTER VACCINE RECOMBINANT, ADJUVANTED 50 MCG/0.5
0.5 KIT INTRAMUSCULAR ONCE
Qty: 1 EACH | Refills: 1 | Status: SHIPPED | OUTPATIENT
Start: 2025-01-28 | End: 2025-01-28

## 2025-01-28 RX ORDER — BENZONATATE 100 MG/1
100 CAPSULE ORAL EVERY 8 HOURS
COMMUNITY
Start: 2025-01-14

## 2025-01-28 RX ORDER — ALBUTEROL SULFATE 90 UG/1
2 INHALANT RESPIRATORY (INHALATION) EVERY 4 HOURS PRN
COMMUNITY
Start: 2025-01-14

## 2025-01-28 NOTE — PROGRESS NOTES
Patient ID: [id number]     Chief Complaint: Annual Exam        HPI:      Disclaimer:  This note is prepared using voice recognition software and as such is likely to have errors despite attempts at proofreading. Please contact me for questions.    Ling Palomo is a 54 y.o. female here today for an annual wellness visit.    Patient has following issues to discuss today    Acute Issues-  S/p left breast lumpectomy for infiltrating ductal carcinoma diagnosed on .   Following Dr. Ira Dumont from [facility].   Post lumpectomy on 2022 and completed radiotherapy regarding the same.   Last diagnosed mammogram on 2024 was normal  Able to tolerate hormonal  aromatase inhibitor without any issues.  Currently on venlafaxine for hot flashes      Thyroid nodule   Is following Dr. Feng from [facility].   Most recent sestamibi scan demonstrated calcified left thyroid nodule measuring 0.6 x 0.9 cm  Biopsy demonstrated colloid nodule and being monitored by Dr. Feng     Prominent right level 2 axillary lymph node.   Parathyroid adenoma  S/p  parathyroidectomy at [facility]  Dr.Sarah Carrillo 3/8/24    DEXA scan on 24 demonstrated osteopenia  Currently not on any oral supplementation    The 10-year ASCVD risk score (Usman DK, et al., 2019) is: 2.9%    Values used to calculate the score:      Age: 54 years      Sex: Female      Is Non- : No      Diabetic: No      Tobacco smoker: No      Systolic Blood Pressure: 122 mmHg      Is BP treated: No      HDL Cholesterol: 45 mg/dL      Total Cholesterol: 241 mg/dL       Chronic Issues-  No past medical history on file.     Past Surgical History:   Procedure Laterality Date    BREAST SURGERY  2022    lumpectomy, mastopexy     SECTION  1987, 1992    CHOLECYSTECTOMY  10/30/2020    COSMETIC SURGERY  2023    Mastopexy    HYSTERECTOMY  2008    Partial    TUBAL LIGATION  1992       Review of  patient's allergies indicates:   Allergen Reactions    Adhesive     Adhesive tape-silicones      Paper tape- redness       Current Outpatient Medications   Medication Instructions    albuterol (PROVENTIL/VENTOLIN HFA) 90 mcg/actuation inhaler 2 puffs, Every 4 hours PRN    benzonatate (TESSALON) 100 mg, Every 8 hours    exemestane (AROMASIN) 25 mg, Every morning    ivermectin (SOOLANTRA) 1 %, Daily    venlafaxine (EFFEXOR-XR) 37.5 MG 24 hr capsule 1 capsule, Every morning       Social History     Socioeconomic History    Marital status:    Tobacco Use    Smoking status: Former     Current packs/day: 0.25     Average packs/day: 0.3 packs/day for 2.0 years (0.5 ttl pk-yrs)     Types: Cigarettes     Passive exposure: Never    Smokeless tobacco: Never   Substance and Sexual Activity    Alcohol use: Yes     Alcohol/week: 1.0 standard drink of alcohol     Types: 1 Glasses of wine per week    Drug use: Not Currently     Types: Marijuana     Comment: as a teenager    Sexual activity: Yes     Partners: Female     Birth control/protection: Post-menopausal     Social Drivers of Health     Financial Resource Strain: Low Risk  (1/22/2024)    Overall Financial Resource Strain (CARDIA)     Difficulty of Paying Living Expenses: Not hard at all   Food Insecurity: No Food Insecurity (1/22/2024)    Hunger Vital Sign     Worried About Running Out of Food in the Last Year: Never true     Ran Out of Food in the Last Year: Never true   Transportation Needs: No Transportation Needs (1/22/2024)    PRAPARE - Transportation     Lack of Transportation (Medical): No     Lack of Transportation (Non-Medical): No   Physical Activity: Unknown (1/22/2024)    Exercise Vital Sign     Days of Exercise per Week: 0 days   Stress: No Stress Concern Present (1/22/2024)    [employer] [place] [employer] - Occupational Stress Questionnaire     Feeling of Stress : Only a little   Housing Stability: Low Risk  (1/22/2024)    Housing Stability  Vital Sign     Unable to Pay for Housing in the Last Year: No     Number of Places Lived in the Last Year: 1     Unstable Housing in the Last Year: No        Family History   Problem Relation Name Age of Onset    Cancer Mother Karolina Saldana     Depression Mother Karolina Saldana     Cancer Father Ravi Brown     Heart disease Father Ravi Brown     Cancer Paternal Grandfather Michael Brown     Cancer Paternal Grandmother Windy Brown     Cancer Paternal Aunt Christel Powers     Cancer Paternal Aunt Crista Swift     Depression Daughter Jovita Alvares     Depression Brother Migue Brown     Diabetes Brother Shabbir Brown         Patient Care Team:  Brianna Khalil MD as PCP - General (Family Medicine)       Subjective:     ROS    See HPI for details    Constitutional: Denies Change in appetite. Denies Chills. Denies Fever. Denies Night sweats.  Respiratory: Denies Cough. Denies Shortness of breath. Denies Shortness of breath with exertion. Denies Wheezing.  Cardiovascular: DeniesChest pain at rest. Denies Chest pain with exertion. Denies Irregular heartbeat. Denies Palpitations. Denies Edema.  Gastrointestinal: Denies Abdominal pain. DeniesDiarrhea. Denies Nausea. Denies Vomiting. Denies Hematemesis or Hematochezia.  Genitourinary: Denies Dysuria. Denies Urinary frequency. Denies Urinary urgency. Denies Blood in urine.  Endocrine: Denies Cold intolerance. Denies Excessive thirst. Denies Heat intolerance. Denies Weight loss. Denies Weight gain.  Musculoskeletal: Denies Painful joints. Denies Weakness.  Integumentary: Denies Rash. Denies Itching. Denies Dry skin.  Neurologic: Denies Dizziness. Denies Fainting. Denies Headache.  Psychiatric: Denies Depression. Denies Anxiety. Denies Suicidal/Homicidal ideations.    All Other ROS: Negative except as stated in HPI.       Objective:     Visit Vitals  /79 (BP Location: Left arm, Patient Position: Sitting)   Pulse 89   Temp  "98.2 °F (36.8 °C) (Oral)   Resp 18   Ht 5' 4.96" (1.65 m)   Wt 85.3 kg (188 lb)   SpO2 96%   BMI 31.32 kg/m²       Physical Exam    General: Alert and oriented, No acute distress.  Neck/Thyroid: Supple, Non-tender  Respiratory: Clear to auscultation bilaterally; No wheezes  Cardiovascular: Regular rate and rhythm  Gastrointestinal: Soft, Non-tender,No palpable organomegaly.  Musculoskeletal: Normal range of motion.  Neurologic: No focal deficits  Psychiatric: Normal interaction, Coherent speech, Euthymic mood, Appropriate affect       Assessment:       ICD-10-CM ICD-9-CM   1. Wellness examination  Z00.00 V70.0   2. Hyperlipidemia, unspecified hyperlipidemia type  E78.5 272.4   3. Need for vaccination for Strep pneumoniae  Z23 V03.82   4. Need for shingles vaccine  Z23 V04.89   5. Well adult health check  Z00.00 V70.0              Health Maintenance Topics with due status: Not Due       Topic Last Completion Date    Colorectal Cancer Screening 10/24/2018    TETANUS VACCINE 08/12/2023    Mammogram 10/08/2024    Hemoglobin A1c (Diabetic Prevention Screening) 01/27/2025    Lipid Panel 01/27/2025    RSV Vaccine (Age 60+ and Pregnant patients) Not Due        Vaccinations -   Immunization History   Administered Date(s) Administered    COVID-19, MRNA, LN-S, PF (Pfizer) (Purple Cap) 04/17/2021, 05/08/2021, 12/11/2021    Influenza - Quadrivalent - [facility] - PF 01/06/2024    Influenza - Quadrivalent - PF *Preferred* (6 months and older) 02/04/2018, 09/28/2020, 12/11/2021    Tdap 08/12/2023          1. Wellness examination  Cervical Cancer Screening - last Pap smear performed by Dr. Farris at [facility]  Breast Cancer Screening - Last Mammogram on 10/24 Normal. Follow up in 1 year  Colon Cancer Screening - Colonoscopy completed at [facility] DR. Scout Dixon on 2018. Repeat in 10 years    Osteoporosis Screening -  DEXA demonstrated osteopenia  Eye Exam - Recommend annually.  Dental Exam - Recommend biannual " exams.      Diet discussed (healthy food choices, reduce portions and overall calorie intake)  Exercise 30-45 minutes 5x per week  Avoid excessive alcohol and tobacco if taking  Immunizations dicussed  Monthly breast exam recommended  Preventative exams discussed.     2. Hyperlipidemia, unspecified hyperlipidemia type  -     Lipid Panel; Future; Expected date: 07/28/2025    3. Need for vaccination for Strep pneumoniae  -     pneumoc 20-manas conj-dip cr,PF, (PREVNAR-20, PF,) 0.5 mL Syrg injection; Inject 0.5 mLs into the muscle once. Please administer vaccine. for 1 dose  Dispense: 0.5 mL; Refill: 0    4. Need for shingles vaccine  -     varicella-zoster gE-AS01B, PF, (SHINGRIX, PF,) 50 mcg/0.5 mL injection; Inject 0.5 mLs into the muscle once. Please administer shingrix vaccine now and repeat dose in 2-6 months. for 1 dose  Dispense: 1 each; Refill: 1    5. Well adult health check  -     CBC Auto Differential; Future; Expected date: 01/28/2025  -     Comprehensive Metabolic Panel; Future; Expected date: 01/28/2025  -     Lipid Panel; Future; Expected date: 01/28/2025  -     TSH; Future; Expected date: 01/28/2025  -     Hemoglobin A1C; Future; Expected date: 01/28/2025  -     Urinalysis; Future; Expected date: 01/28/2025  -     T4, Free; Future; Expected date: 01/28/2025  -     Vitamin D; Future; Expected date: 01/28/2025  -     Uric Acid; Future; Expected date: 01/28/2025  -     Vitamin B12; Future; Expected date: 01/28/2025  -     Folate; Future; Expected date: 01/28/2025      Medication List with Changes/Refills   Current Medications    ALBUTEROL (PROVENTIL/VENTOLIN HFA) 90 MCG/ACTUATION INHALER    Inhale 2 puffs into the lungs every 4 (four) hours as needed.       Start Date: 1/14/2025 End Date: --    BENZONATATE (TESSALON) 100 MG CAPSULE    Take 100 mg by mouth every 8 (eight) hours.       Start Date: 1/14/2025 End Date: --    EXEMESTANE (AROMASIN) 25 MG TABLET    Take 25 mg by mouth every morning.       Start  Date: --        End Date: --    IVERMECTIN (SOOLANTRA) 1 % CREA    Apply 1 % topically Daily.       Start Date: 12/22/2018End Date: --    VENLAFAXINE (EFFEXOR-XR) 37.5 MG 24 HR CAPSULE    Take 1 capsule by mouth every morning.       Start Date: 8/28/2023 End Date: --          The patient's Health Maintenance was reviewed and the following appears to be due at this time:   Health Maintenance Due   Topic Date Due    Shingles Vaccine (1 of 2) Never done    Pneumococcal Vaccines (Age 50+) (1 of 2 - PCV) Never done    Influenza Vaccine (1) 09/01/2024    COVID-19 Vaccine (4 - 2024-25 season) 09/01/2024         Follow up in about 6 months (around 7/28/2025) for Virtual Visit HLD. 1 AW.   In addition to their scheduled follow up, the patient has also been instructed to follow up on as needed basis.     No future appointments.

## 2025-03-27 LAB
CHOLEST SERPL-MCNC: 114 MG/DL
CHOLEST SERPL-MSCNC: 237 MG/DL (ref 0–200)
FERRITIN SERPL-MCNC: 192 NG/ML
HDLC SERPL-MCNC: 46 MG/DL
IRON: 85.1 UG/DL
LDLC SERPL CALC-MCNC: 168 MG/DL (ref 0–160)
PTH, INTACT: 46.6 PG/ML
T4, FREE: 0.89
TESTOST SERPL-MCNC: 42.5 NG/DL
TSH: 0.98
URIC ACID: 5.2
VIT B12 SERPL-MCNC: 405 PG/ML
VITAMIN D (25OHD): 47.2

## 2025-07-30 ENCOUNTER — TELEPHONE (OUTPATIENT)
Dept: FAMILY MEDICINE | Facility: CLINIC | Age: 55
End: 2025-07-30
Payer: COMMERCIAL

## 2025-07-30 DIAGNOSIS — E78.5 HYPERLIPIDEMIA, UNSPECIFIED HYPERLIPIDEMIA TYPE: Primary | ICD-10-CM

## 2025-07-30 NOTE — TELEPHONE ENCOUNTER
Copied from CRM #[id number]. Topic: Appointments - Amb Referral  >> Jul 30, 2025  8:46 AM Latoya wrote:  .Who Called: Ling Laurenrolando Palomo    Caller is requesting assistance/information from provider's office.    Symptoms (please be specific): pt needs clarification on the labs needed for the upcoming appt. States that she went this am to do labs and the comprehensive metabolic panel was not on there and she wants to know what is needed    How long has patient had these symptoms:  n/a   List of preferred pharmacies on file (remove unneeded): [unfilled]  If different, enter pharmacy into here including location and phone number: n/a       Preferred Method of Contact: Phone Call  Patient's Preferred Phone Number on File: [phone]   Best Call Back Number, if different:  Additional Information: pleasee call pt to discuss

## 2025-07-31 ENCOUNTER — PATIENT MESSAGE (OUTPATIENT)
Dept: FAMILY MEDICINE | Facility: CLINIC | Age: 55
End: 2025-07-31
Payer: COMMERCIAL

## 2025-07-31 ENCOUNTER — LAB VISIT (OUTPATIENT)
Dept: LAB | Facility: HOSPITAL | Age: 55
End: 2025-07-31
Attending: STUDENT IN AN ORGANIZED HEALTH CARE EDUCATION/TRAINING PROGRAM
Payer: COMMERCIAL

## 2025-07-31 DIAGNOSIS — E78.5 HYPERLIPIDEMIA, UNSPECIFIED HYPERLIPIDEMIA TYPE: ICD-10-CM

## 2025-07-31 DIAGNOSIS — Z00.00 WELL ADULT HEALTH CHECK: ICD-10-CM

## 2025-07-31 LAB
25(OH)D3+25(OH)D2 SERPL-MCNC: 88 NG/ML (ref 30–80)
BACTERIA #/AREA URNS AUTO: ABNORMAL /HPF
BILIRUB UR QL STRIP.AUTO: NEGATIVE
CAOX CRY UR QL COMP ASSIST: ABNORMAL
CHOLEST SERPL-MCNC: 265 MG/DL
CHOLEST/HDLC SERPL: 5 {RATIO} (ref 0–5)
CLARITY UR: ABNORMAL
COLOR UR AUTO: ABNORMAL
FOLATE SERPL-MCNC: 17.9 NG/ML (ref 7–31.4)
GLUCOSE UR QL STRIP: NORMAL
HDLC SERPL-MCNC: 53 MG/DL (ref 35–60)
HGB UR QL STRIP: NEGATIVE
KETONES UR QL STRIP: NEGATIVE
LDLC SERPL CALC-MCNC: 198 MG/DL (ref 50–140)
LEUKOCYTE ESTERASE UR QL STRIP: 500
NITRITE UR QL STRIP: NEGATIVE
PH UR STRIP: 5 [PH]
PROT UR QL STRIP: NEGATIVE
RBC #/AREA URNS AUTO: ABNORMAL /HPF
SP GR UR STRIP.AUTO: 1.02 (ref 1–1.03)
SQUAMOUS #/AREA URNS LPF: ABNORMAL /HPF
TRIGL SERPL-MCNC: 69 MG/DL (ref 37–140)
URATE SERPL-MCNC: 5 MG/DL (ref 2.6–6)
UROBILINOGEN UR STRIP-ACNC: NORMAL
VIT B12 SERPL-MCNC: 1904 PG/ML (ref 213–816)
VLDLC SERPL CALC-MCNC: 14 MG/DL
WBC #/AREA URNS AUTO: ABNORMAL /HPF

## 2025-07-31 PROCEDURE — 82746 ASSAY OF FOLIC ACID SERUM: CPT

## 2025-07-31 PROCEDURE — 36415 COLL VENOUS BLD VENIPUNCTURE: CPT

## 2025-07-31 PROCEDURE — 84550 ASSAY OF BLOOD/URIC ACID: CPT

## 2025-07-31 PROCEDURE — 82607 VITAMIN B-12: CPT

## 2025-07-31 PROCEDURE — 81001 URINALYSIS AUTO W/SCOPE: CPT

## 2025-07-31 PROCEDURE — 80061 LIPID PANEL: CPT

## 2025-07-31 PROCEDURE — 82306 VITAMIN D 25 HYDROXY: CPT

## 2025-08-04 ENCOUNTER — TELEPHONE (OUTPATIENT)
Dept: FAMILY MEDICINE | Facility: CLINIC | Age: 55
End: 2025-08-04
Payer: COMMERCIAL

## 2025-08-04 NOTE — TELEPHONE ENCOUNTER
----- Message from Elda Tamayo MD sent at 8/1/2025  8:16 AM CDT -----  Please reach out to this patient and ask if she is experiencing any urinary tract symptoms as her urine has some signs of infection. (Painful or frequent urination, foul smelling urine or pelvic   pain) Encourage her to increase her water intake and aim for a light colored urine color.     Thanks.   -Dr. Elda Tamayo    ----- Message -----  From: Lab, Background User  Sent: 7/31/2025   1:05 PM CDT  To: Brianna Khalil MD

## 2025-08-05 ENCOUNTER — DOCUMENTATION ONLY (OUTPATIENT)
Dept: FAMILY MEDICINE | Facility: CLINIC | Age: 55
End: 2025-08-05

## 2025-08-05 ENCOUNTER — OFFICE VISIT (OUTPATIENT)
Dept: FAMILY MEDICINE | Facility: CLINIC | Age: 55
End: 2025-08-05
Payer: COMMERCIAL

## 2025-08-05 VITALS — BODY MASS INDEX: 28.62 KG/M2 | WEIGHT: 171.81 LBS

## 2025-08-05 DIAGNOSIS — E78.00 PURE HYPERCHOLESTEROLEMIA: Primary | ICD-10-CM

## 2025-08-05 DIAGNOSIS — Z86.79 H/O CAROTID ARTERY STENOSIS: ICD-10-CM

## 2025-08-05 PROCEDURE — 1160F RVW MEDS BY RX/DR IN RCRD: CPT | Mod: CPTII,95,,

## 2025-08-05 PROCEDURE — 1159F MED LIST DOCD IN RCRD: CPT | Mod: CPTII,95,,

## 2025-08-05 PROCEDURE — 3044F HG A1C LEVEL LT 7.0%: CPT | Mod: CPTII,95,,

## 2025-08-05 PROCEDURE — 98006 SYNCH AUDIO-VIDEO EST MOD 30: CPT | Mod: 95,,,

## 2025-08-05 RX ORDER — ROSUVASTATIN CALCIUM 10 MG/1
10 TABLET, COATED ORAL DAILY
COMMUNITY

## 2025-08-05 NOTE — PROGRESS NOTES
Family Medicine      Patient ID: [id number]     Chief Complaint: Follow-up and Hyperlipidemia      HPI:     This is a telemedicine note. Patient was treated using telemedicine, real time audio and video, according to [facility] protocols. Nichelle JIMENEZ FNP conducted the visit from the [facility]. The patient participated in the visit at a non-[facility] location selected by the patient, identified below. I am licensed in the state where the patient stated they are located. The patient stated that they understood and accepted the privacy and security risks to their information at their location. This visit is not recorded.    Patient was located at the patient's home.    The patient location is: [place]    Visit type: audiovisual    Face to Face time with patient: 25 min  30 minutes of total time spent on the encounter, which includes face to face time and non-face to face time preparing to see the patient (eg, review of tests), Obtaining and/or reviewing separately obtained history, Documenting clinical information in the electronic or other health record, Independently interpreting results (not separately reported) and communicating results to the patient/family/caregiver, or Care coordination (not separately reported).         Each patient to whom he or she provides medical services by telemedicine is:  (1) informed of the relationship between the physician and patient and the respective role of any other health care provider with respect to management of the patient; and (2) notified that he or she may decline to receive medical services by telemedicine and may withdraw from such care at any time.    Notes:     Ling Palomo is a 55 y.o. female here today for a telemedicine visit.   Patient presents to discuss most recent labs results from 7/31/25.  Patient has a history of hyperlipidemia.  Reluctant to start on script of statin, Crestor 10mg prescribed by previous PCP.  She notes having  carotid US imaging with previous PCP that showed CAD within in the last year, has not had additional vascular workup or calcium score. Last ASCVD score 2.9 % in 2025.  Most recent lipid panel after 6 months of diet and exercise shows worsening in total cholesterol and LDL.   Here to discuss risk vs benefit of starting cholesterol medication.  No other complaints or concerns at this time    History reviewed. No pertinent past medical history.          Past Surgical History:   Procedure Laterality Date    BREAST SURGERY  2022    lumpectomy, mastopexy     SECTION  1987, 1992    CHOLECYSTECTOMY  10/30/2020    COSMETIC SURGERY  2023    Mastopexy    HYSTERECTOMY  2008    Partial    TUBAL LIGATION  1992        Social History     Tobacco Use    Smoking status: Former     Current packs/day: 0.25     Average packs/day: 0.3 packs/day for 2.0 years (0.5 ttl pk-yrs)     Types: Cigarettes     Passive exposure: Never    Smokeless tobacco: Never   Substance and Sexual Activity    Alcohol use: Yes     Alcohol/week: 1.0 standard drink of alcohol     Types: 1 Glasses of wine per week    Drug use: Not Currently     Types: Marijuana     Comment: as a teenager    Sexual activity: Yes     Partners: Female     Birth control/protection: Post-menopausal        Current Outpatient Medications   Medication Instructions    albuterol (PROVENTIL/VENTOLIN HFA) 90 mcg/actuation inhaler 2 puffs, Every 4 hours PRN    benzonatate (TESSALON) 100 mg, Every 8 hours    exemestane (AROMASIN) 25 mg, Every morning    ivermectin (SOOLANTRA) 1 %, Daily    rosuvastatin (CRESTOR) 10 mg, Daily    venlafaxine (EFFEXOR-XR) 37.5 MG 24 hr capsule 1 capsule, Every morning       Review of patient's allergies indicates:   Allergen Reactions    Adhesive     Adhesive tape-silicones      Paper tape- redness        Patient Care Team:  Brianna Khalil MD as PCP - General (Family Medicine)     Subjective:     Review of Systems    Constitutional:  Negative for activity change and unexpected weight change.   HENT:  Negative for hearing loss, rhinorrhea and trouble swallowing.    Eyes:  Negative for discharge and visual disturbance.   Respiratory:  Negative for chest tightness and wheezing.    Cardiovascular:  Negative for chest pain and palpitations.   Gastrointestinal:  Negative for blood in stool, constipation, diarrhea and vomiting.   Endocrine: Negative for polydipsia and polyuria.   Genitourinary:  Negative for difficulty urinating, dysuria, hematuria and menstrual problem.   Musculoskeletal:  Negative for arthralgias, joint swelling and neck pain.   Neurological:  Negative for weakness and headaches.   Psychiatric/Behavioral:  Negative for confusion and dysphoric mood.        12 point review of systems conducted, negative except as stated in the history of present illness. See HPI for details.    Objective:     Visit Vitals  Wt 77.9 kg (171 lb 12.8 oz)   BMI 28.62 kg/m²       Physical Exam: LIMITED DUE TO TELEMEDICINE RESTRICTIONS.  General: Alert and oriented, No acute distress.  Head: Normocephalic.  Eye: Sclera non-icteric.  Neck/Thyroid:  Full range of motion.  Respiratory: Non-labored respirations, Symmetrical chest wall expansion.  Musculoskeletal: Normal range of motion.  Integumentary:  No visible suspicious lesions or rashes. No diaphoresis.   Neurologic: No focal deficits  Psychiatric: Normal interaction, Coherent speech, Euthymic mood, Appropriate affect     Assessment:       ICD-10-CM ICD-9-CM   1. Pure hypercholesterolemia  E78.00 272.0   2. H/O carotid artery stenosis  Z86.79 V12.59        Plan:     1. Pure hypercholesterolemia  -     Ambulatory referral/consult to Vascular Surgery; Future; Expected date: 08/12/2025  -     Comprehensive Metabolic Panel; Future; Expected date: 11/05/2025  -     CK; Future; Expected date: 11/05/2025  -     Lipid Panel; Future; Expected date: 11/05/2025  Lab Results   Component Value Date     TRIG 69 07/31/2025    HDL 53 07/31/2025    TOTALCHOLEST 5 07/31/2025    LDLCALC 198.00 (H) 07/31/2025     Hyperlipidemia Medications              rosuvastatin (CRESTOR) 10 MG tablet Take 10 mg by mouth once daily.        Start crestor 10mg once daily   Calcium score imaging declined at this time  Referral to vascular surgery for additional workup for known CAD.  Stressed importance of dietary modifications. Follow a low cholesterol, low saturated fat diet with less that 200mg of cholesterol a day.  Avoid fried foods and high saturated fats (high saturated fats less than 7% of calories).  Add Flax Seed/Fish Oil supplements to diet. Increase dietary fiber.  Regular exercise can reduce LDL and raise HDL. Stressed importance of physical activity 5 times per week for 30 minutes per day.    The 10-year ASCVD risk score (Usman ESPINOZA, et al., 2019) is: 2.6%    Values used to calculate the score:      Age: 55 years      Sex: Female      Is Non- : No      Diabetic: No      Tobacco smoker: No      Systolic Blood Pressure: 115 mmHg      Is BP treated: No      HDL Cholesterol: 53 mg/dL      Total Cholesterol: 265 mg/dL    2. H/O carotid artery stenosis  -     Ambulatory referral/consult to Vascular Surgery; Future; Expected date: 08/12/2025  -     Comprehensive Metabolic Panel; Future; Expected date: 11/05/2025  -     CK; Future; Expected date: 11/05/2025  -     Lipid Panel; Future; Expected date: 11/05/2025  Consult to vascular surgery for further vessel disease screening for CAD         Follow up in about 4 months (around 12/5/2025), or Cholesterol follow up with MD. In addition to their scheduled follow up, the patient has also been instructed to follow up on as needed basis.     Future Appointments   Date Time Provider Department Center   1/30/2026  9:30 AM Brianna Khalil MD [facility]

## 2025-08-19 ENCOUNTER — DOCUMENTATION ONLY (OUTPATIENT)
Dept: FAMILY MEDICINE | Facility: CLINIC | Age: 55
End: 2025-08-19
Payer: COMMERCIAL

## 2025-08-20 ENCOUNTER — TELEPHONE (OUTPATIENT)
Dept: FAMILY MEDICINE | Facility: CLINIC | Age: 55
End: 2025-08-20
Payer: COMMERCIAL

## 2025-08-22 RX ORDER — TIRZEPATIDE 2.5 MG/.5ML
6 INJECTION, SOLUTION SUBCUTANEOUS WEEKLY
COMMUNITY
Start: 2025-04-02

## 2025-08-22 RX ORDER — SYRINGE AND NEEDLE,INSULIN,1ML 31 GX5/16"
SYRINGE, EMPTY DISPOSABLE MISCELLANEOUS
COMMUNITY
Start: 2025-07-18

## 2025-08-22 RX ORDER — DOXYCYCLINE 40 MG/1
40 CAPSULE ORAL DAILY
COMMUNITY
Start: 2025-07-28

## 2025-08-22 RX ORDER — TRAMADOL HYDROCHLORIDE 50 MG/1
50 TABLET, FILM COATED ORAL 4 TIMES DAILY PRN
COMMUNITY
Start: 2025-05-20

## 2025-09-02 ENCOUNTER — OFFICE VISIT (OUTPATIENT)
Dept: VASCULAR SURGERY | Facility: CLINIC | Age: 55
End: 2025-09-02
Payer: COMMERCIAL

## 2025-09-02 VITALS
HEIGHT: 64 IN | SYSTOLIC BLOOD PRESSURE: 116 MMHG | HEART RATE: 79 BPM | DIASTOLIC BLOOD PRESSURE: 79 MMHG | BODY MASS INDEX: 29.53 KG/M2 | WEIGHT: 173 LBS

## 2025-09-02 DIAGNOSIS — Z87.891 FORMER SMOKER: ICD-10-CM

## 2025-09-02 DIAGNOSIS — I77.9 BILATERAL CAROTID ARTERY DISEASE, UNSPECIFIED TYPE: Primary | ICD-10-CM

## 2025-09-02 PROCEDURE — 3074F SYST BP LT 130 MM HG: CPT | Mod: CPTII,,, | Performed by: SPECIALIST

## 2025-09-02 PROCEDURE — 1159F MED LIST DOCD IN RCRD: CPT | Mod: CPTII,,, | Performed by: SPECIALIST

## 2025-09-02 PROCEDURE — 3078F DIAST BP <80 MM HG: CPT | Mod: CPTII,,, | Performed by: SPECIALIST

## 2025-09-02 PROCEDURE — 3044F HG A1C LEVEL LT 7.0%: CPT | Mod: CPTII,,, | Performed by: SPECIALIST

## 2025-09-02 PROCEDURE — 99203 OFFICE O/P NEW LOW 30 MIN: CPT | Mod: ,,, | Performed by: SPECIALIST

## 2025-09-02 PROCEDURE — 1160F RVW MEDS BY RX/DR IN RCRD: CPT | Mod: CPTII,,, | Performed by: SPECIALIST

## 2025-09-02 PROCEDURE — 3008F BODY MASS INDEX DOCD: CPT | Mod: CPTII,,, | Performed by: SPECIALIST
